# Patient Record
Sex: FEMALE | Race: WHITE | Employment: FULL TIME | URBAN - METROPOLITAN AREA
[De-identification: names, ages, dates, MRNs, and addresses within clinical notes are randomized per-mention and may not be internally consistent; named-entity substitution may affect disease eponyms.]

---

## 2022-03-22 ENCOUNTER — TELEPHONE (OUTPATIENT)
Dept: ALLERGY | Facility: CLINIC | Age: 62
End: 2022-03-22
Payer: COMMERCIAL

## 2022-03-22 NOTE — TELEPHONE ENCOUNTER
Reason for Call:  Same Day Appointment, Requested Provider:  Jesse Zambrano MD    PCP: No primary care provider on file.    Reason for visit: new allergy patient / swollen hand    Duration of symptoms: several weeks off and on    Have you been treated for this in the past? No    Additional comments: Patient requesting to get worked in for sooner. She saw family practice and was given amoxicillin but it did not clear it up    Can we leave a detailed message on this number? YES    Phone number patient can be reached at: [phone]    Best Time: any    Call taken on 3/22/2022 at 1:52 PM by Noel Davidson

## 2022-03-23 ENCOUNTER — OFFICE VISIT (OUTPATIENT)
Dept: ALLERGY | Facility: OTHER | Age: 62
End: 2022-03-23
Payer: COMMERCIAL

## 2022-03-23 VITALS — BODY MASS INDEX: 32.44 KG/M2 | OXYGEN SATURATION: 100 % | WEIGHT: 190.04 LBS | HEIGHT: 64 IN | HEART RATE: 78 BPM

## 2022-03-23 DIAGNOSIS — M19.90 ARTHRITIS: ICD-10-CM

## 2022-03-23 DIAGNOSIS — R21 RASH: Primary | ICD-10-CM

## 2022-03-23 LAB
ALBUMIN SERPL-MCNC: 3.9 G/DL (ref 3.4–5)
ALP SERPL-CCNC: 57 U/L (ref 40–150)
ALT SERPL W P-5'-P-CCNC: 25 U/L (ref 0–50)
ANION GAP SERPL CALCULATED.3IONS-SCNC: 4 MMOL/L (ref 3–14)
AST SERPL W P-5'-P-CCNC: 20 U/L (ref 0–45)
BASOPHILS # BLD AUTO: 0.1 10E3/UL (ref 0–0.2)
BASOPHILS NFR BLD AUTO: 1 %
BILIRUB SERPL-MCNC: 0.5 MG/DL (ref 0.2–1.3)
BUN SERPL-MCNC: 12 MG/DL (ref 7–30)
CALCIUM SERPL-MCNC: 9.7 MG/DL (ref 8.5–10.1)
CHLORIDE BLD-SCNC: 101 MMOL/L (ref 94–109)
CO2 SERPL-SCNC: 31 MMOL/L (ref 20–32)
CREAT SERPL-MCNC: 0.71 MG/DL (ref 0.52–1.04)
EOSINOPHIL # BLD AUTO: 0.6 10E3/UL (ref 0–0.7)
EOSINOPHIL NFR BLD AUTO: 6 %
ERYTHROCYTE [DISTWIDTH] IN BLOOD BY AUTOMATED COUNT: 13.4 % (ref 10–15)
GFR SERPL CREATININE-BSD FRML MDRD: >90 ML/MIN/1.73M2
GLUCOSE BLD-MCNC: 96 MG/DL (ref 70–99)
HCT VFR BLD AUTO: 42.1 % (ref 35–47)
HGB BLD-MCNC: 13.9 G/DL (ref 11.7–15.7)
LYMPHOCYTES # BLD AUTO: 3.3 10E3/UL (ref 0.8–5.3)
LYMPHOCYTES NFR BLD AUTO: 32 %
MCH RBC QN AUTO: 27.9 PG (ref 26.5–33)
MCHC RBC AUTO-ENTMCNC: 33 G/DL (ref 31.5–36.5)
MCV RBC AUTO: 84 FL (ref 78–100)
MONOCYTES # BLD AUTO: 0.5 10E3/UL (ref 0–1.3)
MONOCYTES NFR BLD AUTO: 5 %
NEUTROPHILS # BLD AUTO: 5.6 10E3/UL (ref 1.6–8.3)
NEUTROPHILS NFR BLD AUTO: 56 %
PLATELET # BLD AUTO: 296 10E3/UL (ref 150–450)
POTASSIUM BLD-SCNC: 3.7 MMOL/L (ref 3.4–5.3)
PROT SERPL-MCNC: 8 G/DL (ref 6.8–8.8)
RBC # BLD AUTO: 4.99 10E6/UL (ref 3.8–5.2)
SODIUM SERPL-SCNC: 136 MMOL/L (ref 133–144)
T4 FREE SERPL-MCNC: 0.99 NG/DL (ref 0.76–1.46)
TSH SERPL DL<=0.005 MIU/L-ACNC: 1.91 MU/L (ref 0.4–4)
WBC # BLD AUTO: 10.1 10E3/UL (ref 4–11)

## 2022-03-23 PROCEDURE — 36415 COLL VENOUS BLD VENIPUNCTURE: CPT | Performed by: ALLERGY & IMMUNOLOGY

## 2022-03-23 PROCEDURE — 99204 OFFICE O/P NEW MOD 45 MIN: CPT | Performed by: ALLERGY & IMMUNOLOGY

## 2022-03-23 PROCEDURE — 88185 FLOWCYTOMETRY/TC ADD-ON: CPT | Mod: 90 | Performed by: ALLERGY & IMMUNOLOGY

## 2022-03-23 PROCEDURE — 86376 MICROSOMAL ANTIBODY EACH: CPT | Performed by: ALLERGY & IMMUNOLOGY

## 2022-03-23 PROCEDURE — 84439 ASSAY OF FREE THYROXINE: CPT | Performed by: ALLERGY & IMMUNOLOGY

## 2022-03-23 PROCEDURE — 86800 THYROGLOBULIN ANTIBODY: CPT | Performed by: ALLERGY & IMMUNOLOGY

## 2022-03-23 PROCEDURE — 80050 GENERAL HEALTH PANEL: CPT | Performed by: ALLERGY & IMMUNOLOGY

## 2022-03-23 PROCEDURE — 99000 SPECIMEN HANDLING OFFICE-LAB: CPT | Performed by: ALLERGY & IMMUNOLOGY

## 2022-03-23 PROCEDURE — 88184 FLOWCYTOMETRY/ TC 1 MARKER: CPT | Mod: 90 | Performed by: ALLERGY & IMMUNOLOGY

## 2022-03-23 PROCEDURE — 86038 ANTINUCLEAR ANTIBODIES: CPT | Performed by: ALLERGY & IMMUNOLOGY

## 2022-03-23 RX ORDER — ROSUVASTATIN CALCIUM 20 MG/1
TABLET, COATED ORAL
COMMUNITY
Start: 2022-02-12

## 2022-03-23 RX ORDER — PREDNISONE 20 MG/1
40 TABLET ORAL DAILY
Qty: 10 TABLET | Refills: 0 | Status: SHIPPED | OUTPATIENT
Start: 2022-03-23 | End: 2022-03-28

## 2022-03-23 RX ORDER — ALBUTEROL SULFATE 90 UG/1
1-2 AEROSOL, METERED RESPIRATORY (INHALATION)
COMMUNITY
Start: 2022-03-02

## 2022-03-23 RX ORDER — CETIRIZINE HYDROCHLORIDE 10 MG/1
10 TABLET ORAL DAILY
COMMUNITY

## 2022-03-23 ASSESSMENT — ENCOUNTER SYMPTOMS
CHILLS: 0
EYE DISCHARGE: 0
MYALGIAS: 0
EYE ITCHING: 0
SHORTNESS OF BREATH: 0
VOMITING: 0
WHEEZING: 0
HEADACHES: 0
FEVER: 0
NAUSEA: 0
CHEST TIGHTNESS: 0
EYE REDNESS: 0
ADENOPATHY: 0
COUGH: 0
ACTIVITY CHANGE: 0
RHINORRHEA: 0
DIARRHEA: 0
ARTHRALGIAS: 0
SINUS PRESSURE: 0
FACIAL SWELLING: 0

## 2022-03-23 NOTE — LETTER
3/23/2022         RE: Flory Chino  [address]  [facility] [address]        Dear Colleague,    Thank you for referring your patient, Flory Chino, to the [facility]. Please see a copy of my visit note below.    SUBJECTIVE:                                                               Flory Chino presents today to our Allergy Clinic at [facility] for a new patient visit. She is a 61 year old female with rash.   About 3 weeks ago, she noticed 2 cuts on her left hand. They were bleeding. She sucked on those spots. Later, she developed a large local swelling with pruritus of the skin. She was seen and prescribed amoxicillin for possible cellulitis. Initially, she thought that she felt somewhat better, but then symptoms relapsed and also moved to her right hand. Besides pruritus of her palms and dorsal areas of her hands, she may develop hives, which she describes as a raised, red, pruritic skin lesions lasting from the same spot for 3 to 4 days, sometimes less.  Quite often, her knuckles can get swollen as well. Diphenhydramine is partially helpful. She takes cetirizine 10 mg by mouth once daily and it does not help either.  Her prescription medications are old. She takes some new supplements, but she started taking them after her hands problems.  Sometimes, she may develop some red itchy spots on her distal forearms. Denies having rash or other skin issues on the other parts of the body. No angioedema, wheezing, chest tightness, shortness of breath, rhinoconjunctivitis symptoms, exacerbated with hives.  She denies being sick with a viral respiratory function before symptoms started. No new gloves, or new personal hygiene products.     There is no problem list on file for this patient.      Past Medical History:   Diagnosis Date     Endometriosis      Hypertension          Negative family history of: Asthma, Allergic rhinitis        Past Surgical  History:   Procedure Laterality Date     CHOLECYSTECTOMY       ESOPHAGOSCOPY, GASTROSCOPY, DUODENOSCOPY (EGD), COMBINED  2013    Procedure: COMBINED ESOPHAGOSCOPY, GASTROSCOPY, DUODENOSCOPY (EGD), BIOPSY SINGLE OR MULTIPLE;;  Surgeon: Beau Vega MD;  Location: [facility] OR     GYN SURGERY      Hysterectomy     GYN SURGERY       x2     LAPAROSCOPY       UPPER GI ENDOSCOPY       Social History     Socioeconomic History     Marital status:      Spouse name: None     Number of children: None     Years of education: None     Highest education level: None   Occupational History     None   Tobacco Use     Smoking status: Never Smoker     Smokeless tobacco: Never Used   Vaping Use     Vaping Use: Never used   Substance and Sexual Activity     Alcohol use: Yes     Comment: Occas     Drug use: None     Sexual activity: None   Other Topics Concern     Parent/sibling w/ CABG, MI or angioplasty before 65F 55M? Not Asked   Social History Narrative    ENVIRONMENTAL HISTORY: [place] The home is heated with a forced air and gas furnace. They do have central air conditioning. The patient's bedroom is furnished with hard kev in bedroom.  Pets inside the house include 2 cat(s). There is no history of cockroach or mice infestation. There is/are 0 smokers in the house.  The house does not have a damp basement.      Social Determinants of Health     Financial Resource Strain: Not on file   Food Insecurity: Not on file   Transportation Needs: Not on file   Physical Activity: Not on file   Stress: Not on file   Social Connections: Not on file   Intimate Partner Violence: Not on file   Housing Stability: Not on file           Review of Systems   Constitutional: Negative for activity change, chills and fever.   HENT: Negative for congestion, ear discharge, facial swelling, nosebleeds, postnasal drip, rhinorrhea, sinus pressure and sneezing.    Eyes: Negative for  discharge, redness and itching.   Respiratory: Negative for cough, chest tightness, shortness of breath and wheezing.    Cardiovascular: Negative for chest pain.   Gastrointestinal: Negative for diarrhea, nausea and vomiting.   Musculoskeletal: Negative for arthralgias and myalgias.   Skin: Positive for rash.   Allergic/Immunologic: Negative for environmental allergies and food allergies.   Neurological: Negative for headaches.   Hematological: Negative for adenopathy.   Psychiatric/Behavioral: Negative for behavioral problems and self-injury.           Current Outpatient Medications:      albuterol (PROAIR HFA/PROVENTIL HFA/VENTOLIN HFA) 108 (90 Base) MCG/ACT inhaler, Inhale 1-2 puffs into the lungs, Disp: , Rfl:      GRETA ASPIRIN PO, Take by mouth daily, Disp: , Rfl:      Calcium Carbonate-Vitamin D (CALCIUM + D PO), Take 600 mg by mouth Take 2 tablets (1200mg) daily., Disp: , Rfl:      cetirizine (ZYRTEC ALLERGY) 10 MG tablet, Take 10 mg by mouth daily, Disp: , Rfl:      ESTRADIOL PO, Take 2 mg by mouth 2 times daily, Disp: , Rfl:      LISINOPRIL PO, Take 10 mg by mouth daily, Disp: , Rfl:      Nutritional Supplements (DHEA PO), , Disp: , Rfl:      Omega-3 Fatty Acids (FISH OIL) 600 MG CAPS, Take 1,200 mg by mouth daily, Disp: , Rfl:      omeprazole 20 MG tablet, Take 1 tablet (20 mg) by mouth daily Take 30-60 minutes before a meal., Disp: 90 tablet, Rfl: 3     predniSONE (DELTASONE) 20 MG tablet, Take 2 tablets (40 mg) by mouth daily for 5 days, Disp: 10 tablet, Rfl: 0     QUERCETIN PO, , Disp: , Rfl:      Simvastatin (ZOCOR PO), Take 20 mg by mouth daily, Disp: , Rfl:      TRIAMTERENE PO, Take 37.5 mg by mouth daily, Disp: , Rfl:      Vitamin D3 (CHOLECALCIFEROL) 125 MCG (5000 UT) tablet, Take by mouth daily, Disp: , Rfl:      rosuvastatin (CRESTOR) 20 MG tablet, , Disp: , Rfl:   Immunization History   Administered Date(s) Administered     FLU 6-35 months 10/06/2009, 10/06/2015, 10/04/2016     Influenza (IIV3)  "PF 10/19/2010, 09/16/2014     Influenza Vaccine IM > 6 months Valent IIV4 (Alfuria,Fluzone) 10/17/2017     No Known Allergies  OBJECTIVE:                                                                 Pulse 78   Ht 1.626 m (5' 4\")   Wt 86.2 kg (190 lb 0.6 oz)   SpO2 100%   BMI 32.62 kg/m          Physical Exam  Vitals and nursing note reviewed. Exam conducted with a chaperone present.   Constitutional:       General: She is not in acute distress.     Appearance: She is not ill-appearing, toxic-appearing or diaphoretic.   HENT:      Head: Normocephalic and atraumatic.      Right Ear: Ear canal and external ear normal.      Left Ear: Tympanic membrane and external ear normal.      Nose: No mucosal edema, congestion or rhinorrhea.      Right Turbinates: Not enlarged, swollen or pale.      Left Turbinates: Not enlarged, swollen or pale.      Mouth/Throat:      Lips: Pink.      Mouth: Mucous membranes are moist.      Pharynx: Oropharynx is clear. No pharyngeal swelling, oropharyngeal exudate or uvula swelling.   Eyes:      General:         Right eye: No discharge.         Left eye: No discharge.      Conjunctiva/sclera: Conjunctivae normal.   Pulmonary:      Effort: Pulmonary effort is normal. No respiratory distress.      Breath sounds: Normal air entry. No stridor, decreased air movement or transmitted upper airway sounds. No decreased breath sounds or wheezing.   Musculoskeletal:         General: Normal range of motion.      Cervical back: Normal range of motion.   Lymphadenopathy:      Cervical: No cervical adenopathy.   Skin:     General: Skin is warm.      Capillary Refill: Capillary refill takes less than 2 seconds.      Comments: Right hypothenar area is mildly erythematous.  On the right wrist, there is a dime size, erythematous, slightly raised skin lesion.  No urticarial lesions on the other parts of the body.  No inflammation of hand joints noted.   Neurological:      Mental Status: She is alert and " oriented to person, place, and time.   Psychiatric:         Mood and Affect: Mood normal.         Behavior: Behavior normal.           ASSESSMENT/PLAN:    Rash  Arthritis  Currently idiopathic. Surprisingly, her symptoms are limited to hands only. She also has associated arthritis. Rheumatologic?  -She can wake up in the morning without eating anything with skin lesions, arguing against food or drug allergy.  -I will order MICHELE, thyroid studies, CMP, CBC with differential, and urticaria induced basophil activation test.  -Start prednisone 40 mg by mouth once daily for 5 days.  -Start cetirizine 20 mg by mouth twice daily.  In the future, depending on symptom control, may decrease the dose of cetirizine.  Also, depending on future symptom control, she may need to see Dermatology again rheumatology.    - Urticaria Induced Basophil Activation  - CBC with Platelets & Differential  - Comprehensive metabolic panel  - Anti thyroglobulin antibody  - Thyroid peroxidase antibody  - TSH  - T4 free  - Anti Nuclear Irais IgG by IFA with Reflex  - predniSONE (DELTASONE) 20 MG tablet  Dispense: 10 tablet; Refill: 0        - predniSONE (DELTASONE) 20 MG tablet  Dispense: 10 tablet; Refill: 0       Return in about 6 weeks (around 5/4/2022), or if symptoms worsen or fail to improve.    Thank you for allowing us to participate in the care of this patient. Please feel free to contact us if there are any questions or concerns about the patient.    Disclaimer: This note consists of symbols derived from keyboarding, dictation and/or voice recognition software. As a result, there may be errors in the script that have gone undetected. Please consider this when interpreting information found in this chart.    Jesse Zambrano MD, FAAAAI, FACAAI  Allergy, Asthma and Immunology     [facility]       Again, thank you for allowing me to participate in the care of your patient.         Sincerely,        Jesse Zambrano MD

## 2022-03-23 NOTE — PATIENT INSTRUCTIONS
--Start prednisone 40 mg by mouth once a day for 5 days.   --Start cetirizine 20 mg by mouth twice daily. Will decrease to 10 m gpo twice daily if/when you get better.    Get the bloodwork done.      Allergy Staff Appt Hours Shot Hours Locations    Physician     Jesse Zambrano MD       Support Staff     CHELSEY Aquino CMA  Tuesday:   [place] :  [place]: :         [place]:  [place]        Tuesday: : : : : [facility]       Tues & Wed: : & Thurs:        Fri:          [facility] [facility]  [address] [place] [address]  Appt Line: [phone]    [facility]  [address] [place] [address]  Appt Line: [phone]    Pulmonary Function Scheduling:  [place]: [phone]  [place]: [phone]  [phone]     Prescription Assistance    If you need assistance with your prescriptions (cost, coverage, etc) please contact: [facility] [phone]    Important Scheduling Information    Appointments for skin testing: Appointment will last approximately 45 minutes.  Please call the appointment line for your clinic to schedule.  Discontinue oral antihistamines 7 days prior to the appointment.  Discontinue nasal and ocular antihistamines 4 days prior to appointment.    Appointments for challenges (oral food challenge, penicillin testing, aspirin desensitization) If your provider instructs you to that this additional testing is indicated, it will need to be scheduled directly through the allergy department.  Please contact them via [employer] or by calling the clinic and asking to speak with the allergy team.  They will provide additional information and instructions for the appointment.  Discontinue oral antihistamines 7 days prior to the appointment.   Discontinue nasal and ocular antihistamines 4 days prior to the appointment.    Thank you for trusting us with your care. Please feel free to contact us with any questions or concerns you may have.

## 2022-03-23 NOTE — PROGRESS NOTES
SUBJECTIVE:                                                               Flory Chino presents today to our Allergy Clinic at [facility] for a new patient visit. She is a 61 year old female with rash.   About 3 weeks ago, she noticed 2 cuts on her left hand. They were bleeding. She sucked on those spots. Later, she developed a large local swelling with pruritus of the skin. She was seen and prescribed amoxicillin for possible cellulitis. Initially, she thought that she felt somewhat better, but then symptoms relapsed and also moved to her right hand. Besides pruritus of her palms and dorsal areas of her hands, she may develop hives, which she describes as a raised, red, pruritic skin lesions lasting from the same spot for 3 to 4 days, sometimes less.  Quite often, her knuckles can get swollen as well. Diphenhydramine is partially helpful. She takes cetirizine 10 mg by mouth once daily and it does not help either.  Her prescription medications are old. She takes some new supplements, but she started taking them after her hands problems.  Sometimes, she may develop some red itchy spots on her distal forearms. Denies having rash or other skin issues on the other parts of the body. No angioedema, wheezing, chest tightness, shortness of breath, rhinoconjunctivitis symptoms, exacerbated with hives.  She denies being sick with a viral respiratory function before symptoms started. No new gloves, or new personal hygiene products.     There is no problem list on file for this patient.      Past Medical History:   Diagnosis Date     Endometriosis      Hypertension          Negative family history of: Asthma, Allergic rhinitis        Past Surgical History:   Procedure Laterality Date     CHOLECYSTECTOMY       ESOPHAGOSCOPY, GASTROSCOPY, DUODENOSCOPY (EGD), COMBINED  9/13/2013    Procedure: COMBINED ESOPHAGOSCOPY, GASTROSCOPY, DUODENOSCOPY (EGD), BIOPSY SINGLE OR MULTIPLE;;  Surgeon: Beau Vega,  MD;  Location: [facility] OR     GYN SURGERY      Hysterectomy     GYN SURGERY       x2     LAPAROSCOPY       UPPER GI ENDOSCOPY       Social History     Socioeconomic History     Marital status:      Spouse name: None     Number of children: None     Years of education: None     Highest education level: None   Occupational History     None   Tobacco Use     Smoking status: Never Smoker     Smokeless tobacco: Never Used   Vaping Use     Vaping Use: Never used   Substance and Sexual Activity     Alcohol use: Yes     Comment: Occas     Drug use: None     Sexual activity: None   Other Topics Concern     Parent/sibling w/ CABG, MI or angioplasty before 65F 55M? Not Asked   Social History Narrative    ENVIRONMENTAL HISTORY: [place] The home is heated with a forced air and gas furnace. They do have central air conditioning. The patient's bedroom is furnished with hard kev in bedroom.  Pets inside the house include 2 cat(s). There is no history of cockroach or mice infestation. There is/are 0 smokers in the house.  The house does not have a damp basement.      Social Determinants of Health     Financial Resource Strain: Not on file   Food Insecurity: Not on file   Transportation Needs: Not on file   Physical Activity: Not on file   Stress: Not on file   Social Connections: Not on file   Intimate Partner Violence: Not on file   Housing Stability: Not on file           Review of Systems   Constitutional: Negative for activity change, chills and fever.   HENT: Negative for congestion, ear discharge, facial swelling, nosebleeds, postnasal drip, rhinorrhea, sinus pressure and sneezing.    Eyes: Negative for discharge, redness and itching.   Respiratory: Negative for cough, chest tightness, shortness of breath and wheezing.    Cardiovascular: Negative for chest pain.   Gastrointestinal: Negative for diarrhea, nausea and vomiting.   Musculoskeletal: Negative for  "arthralgias and myalgias.   Skin: Positive for rash.   Allergic/Immunologic: Negative for environmental allergies and food allergies.   Neurological: Negative for headaches.   Hematological: Negative for adenopathy.   Psychiatric/Behavioral: Negative for behavioral problems and self-injury.           Current Outpatient Medications:      albuterol (PROAIR HFA/PROVENTIL HFA/VENTOLIN HFA) 108 (90 Base) MCG/ACT inhaler, Inhale 1-2 puffs into the lungs, Disp: , Rfl:      GRETA ASPIRIN PO, Take by mouth daily, Disp: , Rfl:      Calcium Carbonate-Vitamin D (CALCIUM + D PO), Take 600 mg by mouth Take 2 tablets (1200mg) daily., Disp: , Rfl:      cetirizine (ZYRTEC ALLERGY) 10 MG tablet, Take 10 mg by mouth daily, Disp: , Rfl:      ESTRADIOL PO, Take 2 mg by mouth 2 times daily, Disp: , Rfl:      LISINOPRIL PO, Take 10 mg by mouth daily, Disp: , Rfl:      Nutritional Supplements (DHEA PO), , Disp: , Rfl:      Omega-3 Fatty Acids (FISH OIL) 600 MG CAPS, Take 1,200 mg by mouth daily, Disp: , Rfl:      omeprazole 20 MG tablet, Take 1 tablet (20 mg) by mouth daily Take 30-60 minutes before a meal., Disp: 90 tablet, Rfl: 3     predniSONE (DELTASONE) 20 MG tablet, Take 2 tablets (40 mg) by mouth daily for 5 days, Disp: 10 tablet, Rfl: 0     QUERCETIN PO, , Disp: , Rfl:      Simvastatin (ZOCOR PO), Take 20 mg by mouth daily, Disp: , Rfl:      TRIAMTERENE PO, Take 37.5 mg by mouth daily, Disp: , Rfl:      Vitamin D3 (CHOLECALCIFEROL) 125 MCG (5000 UT) tablet, Take by mouth daily, Disp: , Rfl:      rosuvastatin (CRESTOR) 20 MG tablet, , Disp: , Rfl:   Immunization History   Administered Date(s) Administered     FLU 6-35 months 10/06/2009, 10/06/2015, 10/04/2016     Influenza (IIV3) PF 10/19/2010, 09/16/2014     Influenza Vaccine IM > 6 months Valent IIV4 (Alfuria,Fluzone) 10/17/2017     No Known Allergies  OBJECTIVE:                                                                 Pulse 78   Ht 1.626 m (5' 4\")   Wt 86.2 kg (190 lb " 0.6 oz)   SpO2 100%   BMI 32.62 kg/m          Physical Exam  Vitals and nursing note reviewed. Exam conducted with a chaperone present.   Constitutional:       General: She is not in acute distress.     Appearance: She is not ill-appearing, toxic-appearing or diaphoretic.   HENT:      Head: Normocephalic and atraumatic.      Right Ear: Ear canal and external ear normal.      Left Ear: Tympanic membrane and external ear normal.      Nose: No mucosal edema, congestion or rhinorrhea.      Right Turbinates: Not enlarged, swollen or pale.      Left Turbinates: Not enlarged, swollen or pale.      Mouth/Throat:      Lips: Pink.      Mouth: Mucous membranes are moist.      Pharynx: Oropharynx is clear. No pharyngeal swelling, oropharyngeal exudate or uvula swelling.   Eyes:      General:         Right eye: No discharge.         Left eye: No discharge.      Conjunctiva/sclera: Conjunctivae normal.   Pulmonary:      Effort: Pulmonary effort is normal. No respiratory distress.      Breath sounds: Normal air entry. No stridor, decreased air movement or transmitted upper airway sounds. No decreased breath sounds or wheezing.   Musculoskeletal:         General: Normal range of motion.      Cervical back: Normal range of motion.   Lymphadenopathy:      Cervical: No cervical adenopathy.   Skin:     General: Skin is warm.      Capillary Refill: Capillary refill takes less than 2 seconds.      Comments: Right hypothenar area is mildly erythematous.  On the right wrist, there is a dime size, erythematous, slightly raised skin lesion.  No urticarial lesions on the other parts of the body.  No inflammation of hand joints noted.   Neurological:      Mental Status: She is alert and oriented to person, place, and time.   Psychiatric:         Mood and Affect: Mood normal.         Behavior: Behavior normal.           ASSESSMENT/PLAN:    Rash  Arthritis  Currently idiopathic. Surprisingly, her symptoms are limited to hands only. She also  has associated arthritis. Rheumatologic?  -She can wake up in the morning without eating anything with skin lesions, arguing against food or drug allergy.  -I will order MICHELE, thyroid studies, CMP, CBC with differential, and urticaria induced basophil activation test.  -Start prednisone 40 mg by mouth once daily for 5 days.  -Start cetirizine 20 mg by mouth twice daily.  In the future, depending on symptom control, may decrease the dose of cetirizine.  Also, depending on future symptom control, she may need to see Dermatology again rheumatology.    - Urticaria Induced Basophil Activation  - CBC with Platelets & Differential  - Comprehensive metabolic panel  - Anti thyroglobulin antibody  - Thyroid peroxidase antibody  - TSH  - T4 free  - Anti Nuclear Irais IgG by IFA with Reflex  - predniSONE (DELTASONE) 20 MG tablet  Dispense: 10 tablet; Refill: 0        - predniSONE (DELTASONE) 20 MG tablet  Dispense: 10 tablet; Refill: 0       Return in about 6 weeks (around 5/4/2022), or if symptoms worsen or fail to improve.    Thank you for allowing us to participate in the care of this patient. Please feel free to contact us if there are any questions or concerns about the patient.    Disclaimer: This note consists of symbols derived from keyboarding, dictation and/or voice recognition software. As a result, there may be errors in the script that have gone undetected. Please consider this when interpreting information found in this chart.    Jesse Zambrano MD, FAAAAI, FACAAI  Allergy, Asthma and Immunology     [facility]

## 2022-03-24 LAB
THYROGLOB AB SERPL IA-ACNC: <20 IU/ML
THYROPEROXIDASE AB SERPL-ACNC: <10 IU/ML

## 2022-03-30 LAB — ANA SER QL IF: NEGATIVE

## 2022-03-31 LAB — URTICARIA INDUCED BASOPHIL ACTIVATION: 0 %

## 2022-04-04 NOTE — RESULT ENCOUNTER NOTE
Negative MICHELE.  Thyroid studies including thyroid antibodies are within normal limits.  Negative urticaria induced basophil activation test.  CBC with differential is within normal limits.  Comprehensive metabolic panel is within normal limits.    No changes in the plan.  Follow-up at the beginning of May.  If symptoms continue despite prednisone, recommend evaluation by Dermatology.  If joint pain continues, consider seeing Rheumatology as well.